# Patient Record
Sex: MALE | Race: WHITE | NOT HISPANIC OR LATINO
[De-identification: names, ages, dates, MRNs, and addresses within clinical notes are randomized per-mention and may not be internally consistent; named-entity substitution may affect disease eponyms.]

---

## 2017-09-27 ENCOUNTER — APPOINTMENT (OUTPATIENT)
Dept: UROLOGY | Facility: CLINIC | Age: 62
End: 2017-09-27
Payer: COMMERCIAL

## 2017-09-27 LAB
APPEARANCE: CLEAR
BACTERIA: NEGATIVE
BILIRUBIN URINE: NEGATIVE
BLOOD URINE: NEGATIVE
CALCIUM OXALATE CRYSTALS: NEGATIVE
COLOR: YELLOW
GLUCOSE QUALITATIVE U: NORMAL MG/DL
GRANULAR CASTS: 0 /LPF
HYALINE CASTS: 0 /LPF
KETONES URINE: NEGATIVE
LEUKOCYTE ESTERASE URINE: NEGATIVE
MICROSCOPIC-UA: NORMAL
NITRITE URINE: NEGATIVE
PH URINE: 5.5
PROTEIN URINE: NEGATIVE MG/DL
RED BLOOD CELLS URINE: 0 /HPF
SPECIFIC GRAVITY URINE: 1.02
SQUAMOUS EPITHELIAL CELLS: 1 /HPF
TRIPLE PHOSPHATE CRYSTALS: NEGATIVE
URIC ACID CRYSTALS: NEGATIVE
UROBILINOGEN URINE: NORMAL MG/DL
WHITE BLOOD CELLS URINE: 1 /HPF

## 2017-09-27 PROCEDURE — 99213 OFFICE O/P EST LOW 20 MIN: CPT

## 2017-09-28 LAB
ANION GAP SERPL CALC-SCNC: 23 MMOL/L
BUN SERPL-MCNC: 21 MG/DL
CALCIUM SERPL-MCNC: 10.4 MG/DL
CHLORIDE SERPL-SCNC: 106 MMOL/L
CO2 SERPL-SCNC: 17 MMOL/L
CREAT SERPL-MCNC: 1.33 MG/DL
GLUCOSE SERPL-MCNC: 80 MG/DL
POTASSIUM SERPL-SCNC: 4.3 MMOL/L
PSA FREE FLD-MCNC: 53.3
PSA FREE SERPL-MCNC: 0.32 NG/ML
PSA SERPL-MCNC: 0.6 NG/ML
SODIUM SERPL-SCNC: 146 MMOL/L

## 2018-09-20 ENCOUNTER — APPOINTMENT (OUTPATIENT)
Dept: UROLOGY | Facility: CLINIC | Age: 63
End: 2018-09-20
Payer: COMMERCIAL

## 2018-09-20 PROCEDURE — 99214 OFFICE O/P EST MOD 30 MIN: CPT

## 2018-09-21 LAB
APPEARANCE: CLEAR
BACTERIA: NEGATIVE
BILIRUBIN URINE: NEGATIVE
BLOOD URINE: NEGATIVE
COLOR: YELLOW
CORE LAB FLUID CYTOLOGY: NORMAL
GLUCOSE QUALITATIVE U: NEGATIVE MG/DL
HYALINE CASTS: 0 /LPF
KETONES URINE: NEGATIVE
LEUKOCYTE ESTERASE URINE: NEGATIVE
MICROSCOPIC-UA: NORMAL
NITRITE URINE: NEGATIVE
PH URINE: 6
PROTEIN URINE: NEGATIVE MG/DL
PSA FREE FLD-MCNC: 59.4
PSA FREE SERPL-MCNC: 0.41 NG/ML
PSA SERPL-MCNC: 0.69 NG/ML
RED BLOOD CELLS URINE: 1 /HPF
SPECIFIC GRAVITY URINE: 1.01
SQUAMOUS EPITHELIAL CELLS: 0 /HPF
UROBILINOGEN URINE: NEGATIVE MG/DL
WHITE BLOOD CELLS URINE: 0 /HPF

## 2018-09-26 ENCOUNTER — APPOINTMENT (OUTPATIENT)
Dept: UROLOGY | Facility: CLINIC | Age: 63
End: 2018-09-26

## 2019-01-02 ENCOUNTER — TRANSCRIPTION ENCOUNTER (OUTPATIENT)
Age: 64
End: 2019-01-02

## 2019-09-25 ENCOUNTER — APPOINTMENT (OUTPATIENT)
Dept: UROLOGY | Facility: CLINIC | Age: 64
End: 2019-09-25
Payer: COMMERCIAL

## 2019-09-25 PROCEDURE — 99214 OFFICE O/P EST MOD 30 MIN: CPT

## 2019-09-26 LAB
APPEARANCE: CLEAR
BACTERIA: NEGATIVE
BILIRUBIN URINE: NEGATIVE
BLOOD URINE: NEGATIVE
COLOR: NORMAL
GLUCOSE QUALITATIVE U: NEGATIVE
HYALINE CASTS: 0 /LPF
KETONES URINE: NEGATIVE
LEUKOCYTE ESTERASE URINE: NEGATIVE
MICROSCOPIC-UA: NORMAL
NITRITE URINE: NEGATIVE
PH URINE: 6
PROTEIN URINE: NEGATIVE
PSA FREE FLD-MCNC: 51 %
PSA FREE SERPL-MCNC: 0.3 NG/ML
PSA SERPL-MCNC: 0.58 NG/ML
RED BLOOD CELLS URINE: 1 /HPF
SPECIFIC GRAVITY URINE: 1.01
SQUAMOUS EPITHELIAL CELLS: 0 /HPF
UROBILINOGEN URINE: NORMAL
WHITE BLOOD CELLS URINE: 0 /HPF

## 2019-11-30 ENCOUNTER — TRANSCRIPTION ENCOUNTER (OUTPATIENT)
Age: 64
End: 2019-11-30

## 2020-09-23 ENCOUNTER — APPOINTMENT (OUTPATIENT)
Dept: UROLOGY | Facility: CLINIC | Age: 65
End: 2020-09-23
Payer: COMMERCIAL

## 2020-09-23 VITALS — TEMPERATURE: 97.6 F

## 2020-09-23 LAB
PSA FREE FLD-MCNC: 59 %
PSA FREE SERPL-MCNC: 0.33 NG/ML
PSA SERPL-MCNC: 0.56 NG/ML

## 2020-09-23 PROCEDURE — 99214 OFFICE O/P EST MOD 30 MIN: CPT

## 2020-09-24 LAB
APPEARANCE: CLEAR
BACTERIA: NEGATIVE
BILIRUBIN URINE: NEGATIVE
BLOOD URINE: NEGATIVE
COLOR: NORMAL
GLUCOSE QUALITATIVE U: NEGATIVE
HYALINE CASTS: 0 /LPF
KETONES URINE: NEGATIVE
LEUKOCYTE ESTERASE URINE: NEGATIVE
MICROSCOPIC-UA: NORMAL
NITRITE URINE: NEGATIVE
PH URINE: 5.5
PROTEIN URINE: NEGATIVE
RED BLOOD CELLS URINE: 1 /HPF
SPECIFIC GRAVITY URINE: 1.03
SQUAMOUS EPITHELIAL CELLS: 0 /HPF
UROBILINOGEN URINE: NORMAL
WHITE BLOOD CELLS URINE: 1 /HPF

## 2021-07-28 ENCOUNTER — NON-APPOINTMENT (OUTPATIENT)
Age: 66
End: 2021-07-28

## 2021-09-22 ENCOUNTER — APPOINTMENT (OUTPATIENT)
Dept: UROLOGY | Facility: CLINIC | Age: 66
End: 2021-09-22
Payer: COMMERCIAL

## 2021-09-22 LAB
APPEARANCE: CLEAR
BACTERIA: NEGATIVE
BILIRUBIN URINE: NEGATIVE
BLOOD URINE: NEGATIVE
COLOR: NORMAL
GLUCOSE QUALITATIVE U: NEGATIVE
HYALINE CASTS: 2 /LPF
KETONES URINE: NEGATIVE
LEUKOCYTE ESTERASE URINE: NEGATIVE
MICROSCOPIC-UA: NORMAL
NITRITE URINE: NEGATIVE
PH URINE: 6
PROTEIN URINE: NEGATIVE
PSA FREE FLD-MCNC: 51 %
PSA FREE SERPL-MCNC: 0.32 NG/ML
PSA SERPL-MCNC: 0.63 NG/ML
RED BLOOD CELLS URINE: 0 /HPF
SPECIFIC GRAVITY URINE: 1.02
SQUAMOUS EPITHELIAL CELLS: 0 /HPF
UROBILINOGEN URINE: NORMAL
WHITE BLOOD CELLS URINE: 1 /HPF

## 2021-09-22 PROCEDURE — 99214 OFFICE O/P EST MOD 30 MIN: CPT

## 2022-09-21 ENCOUNTER — APPOINTMENT (OUTPATIENT)
Dept: UROLOGY | Facility: CLINIC | Age: 67
End: 2022-09-21

## 2022-09-21 VITALS
WEIGHT: 205 LBS | HEART RATE: 80 BPM | RESPIRATION RATE: 16 BRPM | TEMPERATURE: 98 F | OXYGEN SATURATION: 98 % | SYSTOLIC BLOOD PRESSURE: 126 MMHG | BODY MASS INDEX: 30.36 KG/M2 | HEIGHT: 69 IN | DIASTOLIC BLOOD PRESSURE: 86 MMHG

## 2022-09-21 DIAGNOSIS — R35.0 FREQUENCY OF MICTURITION: ICD-10-CM

## 2022-09-21 PROCEDURE — 99214 OFFICE O/P EST MOD 30 MIN: CPT

## 2022-09-22 LAB
APPEARANCE: CLEAR
BACTERIA: NEGATIVE
BILIRUBIN URINE: NEGATIVE
BLOOD URINE: NEGATIVE
COLOR: YELLOW
GLUCOSE QUALITATIVE U: NEGATIVE
HYALINE CASTS: 1 /LPF
KETONES URINE: NEGATIVE
LEUKOCYTE ESTERASE URINE: NEGATIVE
MICROSCOPIC-UA: NORMAL
NITRITE URINE: NEGATIVE
PH URINE: 6
PROTEIN URINE: NEGATIVE
PSA FREE FLD-MCNC: 49 %
PSA FREE SERPL-MCNC: 0.31 NG/ML
PSA SERPL-MCNC: 0.65 NG/ML
RED BLOOD CELLS URINE: 1 /HPF
SPECIFIC GRAVITY URINE: 1.02
SQUAMOUS EPITHELIAL CELLS: 0 /HPF
UROBILINOGEN URINE: NORMAL
WHITE BLOOD CELLS URINE: 0 /HPF

## 2022-09-23 LAB — URINE CYTOLOGY: NORMAL

## 2023-08-18 ENCOUNTER — OFFICE (OUTPATIENT)
Facility: LOCATION | Age: 68
Setting detail: OPHTHALMOLOGY
End: 2023-08-18
Payer: MEDICARE

## 2023-08-18 DIAGNOSIS — H40.013: ICD-10-CM

## 2023-08-18 DIAGNOSIS — H52.03: ICD-10-CM

## 2023-08-18 DIAGNOSIS — H52.7: ICD-10-CM

## 2023-08-18 PROCEDURE — 99212 OFFICE O/P EST SF 10 MIN: CPT | Performed by: OPHTHALMOLOGY

## 2023-08-18 PROCEDURE — 92015 DETERMINE REFRACTIVE STATE: CPT | Performed by: OPHTHALMOLOGY

## 2023-08-18 PROCEDURE — 92020 GONIOSCOPY: CPT | Performed by: OPHTHALMOLOGY

## 2023-08-18 PROCEDURE — 92133 CPTRZD OPH DX IMG PST SGM ON: CPT | Performed by: OPHTHALMOLOGY

## 2023-08-18 ASSESSMENT — REFRACTION_MANIFEST
OU_VA: 20/20
OS_CYLINDER: SPH
OD_SPHERE: +1.50
OS_VA1: 20/20
OS_CYLINDER: SPH
OS_SPHERE: +1.25
OS_ADD: +2.50
OD_VA1: 20/15
OD_ADD: +2.50
OD_AXIS: 090
OD_VA1: 20/20
OS_SPHERE: +1.25
OD_CYLINDER: -0.25
OD_CYLINDER: SPH
OD_SPHERE: +1.50
OS_VA1: 20/20

## 2023-08-18 ASSESSMENT — KERATOMETRY
OD_AXISANGLE_DEGREES: 115
OS_AXISANGLE_DEGREES: 058
OS_K1POWER_DIOPTERS: 41.00
OS_K2POWER_DIOPTERS: 40.75
OD_K2POWER_DIOPTERS: 40.50
OD_K1POWER_DIOPTERS: 41.00

## 2023-08-18 ASSESSMENT — VISUAL ACUITY
OD_BCVA: 20/25+2
OS_BCVA: 20/25

## 2023-08-18 ASSESSMENT — LID EXAM ASSESSMENTS: OD_EDEMA: RUL 2+

## 2023-08-18 ASSESSMENT — SPHEQUIV_DERIVED
OD_SPHEQUIV: 1.75
OD_SPHEQUIV: 1.375

## 2023-08-18 ASSESSMENT — REFRACTION_CURRENTRX
OD_VPRISM_DIRECTION: PROGS
OS_OVR_VA: 20/
OD_ADD: +2.25
OS_SPHERE: +0.75
OD_SPHERE: +0.75
OS_ADD: +2.25
OD_OVR_VA: 20/
OS_CYLINDER: SPH
OD_CYLINDER: SPH
OS_VPRISM_DIRECTION: PROGS

## 2023-08-18 ASSESSMENT — REFRACTION_AUTOREFRACTION
OD_CYLINDER: -1.00
OD_SPHERE: +2.25
OS_SPHERE: +2.25
OD_AXIS: 107
OS_CYLINDER: SPH

## 2023-08-18 ASSESSMENT — AXIALLENGTH_DERIVED
OD_AL: 23.9248
OD_AL: 24.076

## 2023-08-18 ASSESSMENT — CONFRONTATIONAL VISUAL FIELD TEST (CVF)
OS_FINDINGS: FULL
OD_FINDINGS: FULL

## 2023-09-21 ENCOUNTER — APPOINTMENT (OUTPATIENT)
Dept: UROLOGY | Facility: CLINIC | Age: 68
End: 2023-09-21
Payer: MEDICARE

## 2023-09-21 VITALS
HEART RATE: 58 BPM | SYSTOLIC BLOOD PRESSURE: 151 MMHG | RESPIRATION RATE: 16 BRPM | WEIGHT: 190 LBS | HEIGHT: 69 IN | OXYGEN SATURATION: 98 % | BODY MASS INDEX: 28.14 KG/M2 | DIASTOLIC BLOOD PRESSURE: 91 MMHG

## 2023-09-21 DIAGNOSIS — N40.1 BENIGN PROSTATIC HYPERPLASIA WITH LOWER URINARY TRACT SYMPMS: ICD-10-CM

## 2023-09-21 DIAGNOSIS — C64.9 MALIGNANT NEOPLASM OF UNSPECIFIED KIDNEY, EXCEPT RENAL PELVIS: ICD-10-CM

## 2023-09-21 DIAGNOSIS — N13.8 BENIGN PROSTATIC HYPERPLASIA WITH LOWER URINARY TRACT SYMPMS: ICD-10-CM

## 2023-09-21 PROCEDURE — 99214 OFFICE O/P EST MOD 30 MIN: CPT

## 2023-09-22 LAB
APPEARANCE: CLEAR
BACTERIA: NEGATIVE /HPF
BILIRUBIN URINE: NEGATIVE
BLOOD URINE: NEGATIVE
CAST: 0 /LPF
COLOR: YELLOW
EPITHELIAL CELLS: 0 /HPF
GLUCOSE QUALITATIVE U: NEGATIVE MG/DL
KETONES URINE: NEGATIVE MG/DL
LEUKOCYTE ESTERASE URINE: NEGATIVE
MICROSCOPIC-UA: NORMAL
NITRITE URINE: NEGATIVE
PH URINE: 5.5
PROTEIN URINE: NEGATIVE MG/DL
PSA FREE FLD-MCNC: 51 %
PSA FREE SERPL-MCNC: 0.31 NG/ML
PSA SERPL-MCNC: 0.61 NG/ML
RED BLOOD CELLS URINE: 0 /HPF
SPECIFIC GRAVITY URINE: 1.01
UROBILINOGEN URINE: 0.2 MG/DL
WHITE BLOOD CELLS URINE: 0 /HPF

## 2024-02-28 ENCOUNTER — EMERGENCY (EMERGENCY)
Facility: HOSPITAL | Age: 69
LOS: 1 days | Discharge: HOME | End: 2024-02-28
Attending: EMERGENCY MEDICINE
Payer: MEDICARE

## 2024-02-28 VITALS
SYSTOLIC BLOOD PRESSURE: 144 MMHG | HEIGHT: 70 IN | RESPIRATION RATE: 18 BRPM | DIASTOLIC BLOOD PRESSURE: 91 MMHG | OXYGEN SATURATION: 97 % | HEART RATE: 97 BPM | TEMPERATURE: 98 F | WEIGHT: 190.04 LBS

## 2024-02-28 LAB
ALBUMIN SERPL ELPH-MCNC: 4.3 G/DL — SIGNIFICANT CHANGE UP (ref 3.3–5)
ALP SERPL-CCNC: 50 U/L — SIGNIFICANT CHANGE UP (ref 40–120)
ALT FLD-CCNC: <37 U/L — SIGNIFICANT CHANGE UP (ref 10–45)
ANION GAP SERPL CALC-SCNC: 11 MMOL/L — SIGNIFICANT CHANGE UP (ref 5–17)
ANION GAP SERPL CALC-SCNC: 12 MMOL/L — SIGNIFICANT CHANGE UP (ref 5–17)
AST SERPL-CCNC: 99 U/L — HIGH (ref 10–40)
BASOPHILS # BLD AUTO: 0.03 K/UL — SIGNIFICANT CHANGE UP (ref 0–0.2)
BASOPHILS NFR BLD AUTO: 0.4 % — SIGNIFICANT CHANGE UP (ref 0–2)
BILIRUB SERPL-MCNC: 1 MG/DL — SIGNIFICANT CHANGE UP (ref 0.2–1.2)
BUN SERPL-MCNC: 19 MG/DL — SIGNIFICANT CHANGE UP (ref 7–23)
BUN SERPL-MCNC: 21 MG/DL — SIGNIFICANT CHANGE UP (ref 7–23)
CALCIUM SERPL-MCNC: 9.4 MG/DL — SIGNIFICANT CHANGE UP (ref 8.4–10.5)
CALCIUM SERPL-MCNC: 9.7 MG/DL — SIGNIFICANT CHANGE UP (ref 8.4–10.5)
CHLORIDE SERPL-SCNC: 100 MMOL/L — SIGNIFICANT CHANGE UP (ref 96–108)
CHLORIDE SERPL-SCNC: 101 MMOL/L — SIGNIFICANT CHANGE UP (ref 96–108)
CO2 SERPL-SCNC: 21 MMOL/L — LOW (ref 22–31)
CO2 SERPL-SCNC: 25 MMOL/L — SIGNIFICANT CHANGE UP (ref 22–31)
CREAT SERPL-MCNC: 1.08 MG/DL — SIGNIFICANT CHANGE UP (ref 0.5–1.3)
CREAT SERPL-MCNC: 1.16 MG/DL — SIGNIFICANT CHANGE UP (ref 0.5–1.3)
EGFR: 69 ML/MIN/1.73M2 — SIGNIFICANT CHANGE UP
EGFR: 76 ML/MIN/1.73M2 — SIGNIFICANT CHANGE UP
EOSINOPHIL # BLD AUTO: 0.05 K/UL — SIGNIFICANT CHANGE UP (ref 0–0.5)
EOSINOPHIL NFR BLD AUTO: 0.7 % — SIGNIFICANT CHANGE UP (ref 0–6)
FLUAV AG NPH QL: SIGNIFICANT CHANGE UP
FLUBV AG NPH QL: SIGNIFICANT CHANGE UP
GLUCOSE SERPL-MCNC: 106 MG/DL — HIGH (ref 70–99)
GLUCOSE SERPL-MCNC: 119 MG/DL — HIGH (ref 70–99)
HCT VFR BLD CALC: 42.8 % — SIGNIFICANT CHANGE UP (ref 39–50)
HGB BLD-MCNC: 14.2 G/DL — SIGNIFICANT CHANGE UP (ref 13–17)
IMM GRANULOCYTES NFR BLD AUTO: 0.3 % — SIGNIFICANT CHANGE UP (ref 0–0.9)
INR BLD: 1.09 RATIO — SIGNIFICANT CHANGE UP (ref 0.85–1.18)
LYMPHOCYTES # BLD AUTO: 0.97 K/UL — LOW (ref 1–3.3)
LYMPHOCYTES # BLD AUTO: 13.4 % — SIGNIFICANT CHANGE UP (ref 13–44)
MCHC RBC-ENTMCNC: 29.5 PG — SIGNIFICANT CHANGE UP (ref 27–34)
MCHC RBC-ENTMCNC: 33.2 GM/DL — SIGNIFICANT CHANGE UP (ref 32–36)
MCV RBC AUTO: 89 FL — SIGNIFICANT CHANGE UP (ref 80–100)
MONOCYTES # BLD AUTO: 0.7 K/UL — SIGNIFICANT CHANGE UP (ref 0–0.9)
MONOCYTES NFR BLD AUTO: 9.7 % — SIGNIFICANT CHANGE UP (ref 2–14)
NEUTROPHILS # BLD AUTO: 5.47 K/UL — SIGNIFICANT CHANGE UP (ref 1.8–7.4)
NEUTROPHILS NFR BLD AUTO: 75.5 % — SIGNIFICANT CHANGE UP (ref 43–77)
NRBC # BLD: 0 /100 WBCS — SIGNIFICANT CHANGE UP (ref 0–0)
PLATELET # BLD AUTO: 208 K/UL — SIGNIFICANT CHANGE UP (ref 150–400)
POTASSIUM SERPL-MCNC: 4.3 MMOL/L — SIGNIFICANT CHANGE UP (ref 3.5–5.3)
POTASSIUM SERPL-MCNC: SIGNIFICANT CHANGE UP MMOL/L (ref 3.5–5.3)
POTASSIUM SERPL-SCNC: 4.3 MMOL/L — SIGNIFICANT CHANGE UP (ref 3.5–5.3)
POTASSIUM SERPL-SCNC: SIGNIFICANT CHANGE UP MMOL/L (ref 3.5–5.3)
PROT SERPL-MCNC: 8.1 G/DL — SIGNIFICANT CHANGE UP (ref 6–8.3)
PROTHROM AB SERPL-ACNC: 11.4 SEC — SIGNIFICANT CHANGE UP (ref 9.5–13)
RBC # BLD: 4.81 M/UL — SIGNIFICANT CHANGE UP (ref 4.2–5.8)
RBC # FLD: 12.6 % — SIGNIFICANT CHANGE UP (ref 10.3–14.5)
RSV RNA NPH QL NAA+NON-PROBE: SIGNIFICANT CHANGE UP
SARS-COV-2 RNA SPEC QL NAA+PROBE: SIGNIFICANT CHANGE UP
SODIUM SERPL-SCNC: 132 MMOL/L — LOW (ref 135–145)
SODIUM SERPL-SCNC: 138 MMOL/L — SIGNIFICANT CHANGE UP (ref 135–145)
TROPONIN T, HIGH SENSITIVITY RESULT: 9 NG/L — SIGNIFICANT CHANGE UP (ref 0–51)
WBC # BLD: 7.24 K/UL — SIGNIFICANT CHANGE UP (ref 3.8–10.5)
WBC # FLD AUTO: 7.24 K/UL — SIGNIFICANT CHANGE UP (ref 3.8–10.5)

## 2024-02-28 PROCEDURE — 99223 1ST HOSP IP/OBS HIGH 75: CPT | Mod: FS

## 2024-02-28 PROCEDURE — 70496 CT ANGIOGRAPHY HEAD: CPT | Mod: 26,MC

## 2024-02-28 PROCEDURE — 70498 CT ANGIOGRAPHY NECK: CPT | Mod: 26,MC

## 2024-02-28 PROCEDURE — 70551 MRI BRAIN STEM W/O DYE: CPT | Mod: 26,MC

## 2024-02-28 PROCEDURE — 70450 CT HEAD/BRAIN W/O DYE: CPT | Mod: 26,MC,XU

## 2024-02-28 RX ORDER — SODIUM CHLORIDE 9 MG/ML
1000 INJECTION INTRAMUSCULAR; INTRAVENOUS; SUBCUTANEOUS ONCE
Refills: 0 | Status: COMPLETED | OUTPATIENT
Start: 2024-02-28 | End: 2024-02-28

## 2024-02-28 RX ORDER — DIPHENHYDRAMINE HCL 50 MG
25 CAPSULE ORAL ONCE
Refills: 0 | Status: ACTIVE | OUTPATIENT
Start: 2024-02-28 | End: 2025-01-26

## 2024-02-28 RX ORDER — FENOFIBRATE,MICRONIZED 130 MG
145 CAPSULE ORAL DAILY
Refills: 0 | Status: ACTIVE | OUTPATIENT
Start: 2024-02-28 | End: 2025-01-26

## 2024-02-28 RX ORDER — ATORVASTATIN CALCIUM 80 MG/1
40 TABLET, FILM COATED ORAL AT BEDTIME
Refills: 0 | Status: ACTIVE | OUTPATIENT
Start: 2024-02-28 | End: 2025-01-26

## 2024-02-28 RX ORDER — AMLODIPINE BESYLATE 2.5 MG/1
10 TABLET ORAL DAILY
Refills: 0 | Status: ACTIVE | OUTPATIENT
Start: 2024-02-28 | End: 2025-01-26

## 2024-02-28 RX ORDER — MECLIZINE HCL 12.5 MG
25 TABLET ORAL ONCE
Refills: 0 | Status: COMPLETED | OUTPATIENT
Start: 2024-02-28 | End: 2024-02-28

## 2024-02-28 RX ORDER — MECLIZINE HCL 12.5 MG
12.5 TABLET ORAL
Refills: 0 | Status: ACTIVE | OUTPATIENT
Start: 2024-02-28 | End: 2025-01-26

## 2024-02-28 RX ORDER — METOCLOPRAMIDE HCL 10 MG
10 TABLET ORAL ONCE
Refills: 0 | Status: ACTIVE | OUTPATIENT
Start: 2024-02-28 | End: 2025-01-26

## 2024-02-28 RX ADMIN — ATORVASTATIN CALCIUM 40 MILLIGRAM(S): 80 TABLET, FILM COATED ORAL at 23:42

## 2024-02-28 RX ADMIN — Medication 25 MILLIGRAM(S): at 16:55

## 2024-02-28 RX ADMIN — SODIUM CHLORIDE 1000 MILLILITER(S): 9 INJECTION INTRAMUSCULAR; INTRAVENOUS; SUBCUTANEOUS at 16:53

## 2024-02-28 NOTE — ED CDU PROVIDER INITIAL DAY NOTE - CLINICAL SUMMARY MEDICAL DECISION MAKING FREE TEXT BOX
Attending Kathy Holman: 69 yo male presenting with dizziness. placed in cdu for mri and neuro checks

## 2024-02-28 NOTE — ED PROVIDER NOTE - CLINICAL SUMMARY MEDICAL DECISION MAKING FREE TEXT BOX
Attending Kathy Holman: 69 yo male presenting with dizziness. code stroke called upon arrival and pt taken to ct scan. pt reports feeling dizzy this am worse with moving his head. no falls or recent trauma. did report recent uri symptoms. seen by neurology andtaken to ct scan. nonfocal neurologic exam. trenght and senation in tact, no protonator drift. consider possible vetibular neuritis vs posterior infarct vs dissection. cta performed and no evidence of hemorrhage. d/w neuro who are recommending cdu for neuro checks and mri.

## 2024-02-28 NOTE — ED CDU PROVIDER INITIAL DAY NOTE - ATTENDING APP SHARED VISIT CONTRIBUTION OF CARE
Attending MD Kathy Holman:  I personally made/approved the management plan and take responsibility for the patient management.  Physician assistant note reviewed and agree on plan of care and except where noted.  See HPI, PE, and MDM for details.

## 2024-02-28 NOTE — ED ADULT NURSE NOTE - NSFALLRISKINTERV_ED_ALL_ED

## 2024-02-28 NOTE — CONSULT NOTE ADULT - ASSESSMENT
ASSESSMENT   68-year-old male with history of renal cell carcinoma status post nephrectomy, hyperlipidemia presenting with concern for dizziness.   Patient states last night he started not feeling well and then approximately 3 AM started having low-grade fevers, though at this time did not have any sensations of dizziness. On day of presentation, when getting out of bed felt the onset of 'room spinning' + nausea. Symptoms worsened with sitting up/standing up, also worsened with turning head to the L. Has had 1 prior episode of vertigo in the past (similar but less severe in nature), was attributed to medication (BP medication) adjustments. Reports no recent illnesses infections.  /91  glucose 108   2/28/24  NIHSS 0  MRS 0    IMPRESSION   Acute vestibular syndrome which may be more peripheral, lower suspicion for central etiology given fluctuating symptoms, prior similar episode, FH vertigo.     RECOMMENDATION       Patient to be seen by team and attending. Note finalized upon attending attestation.  ASSESSMENT   68-year-old male with history of renal cell carcinoma status post nephrectomy, hyperlipidemia presenting with concern for dizziness.   Patient states last night he started not feeling well and then approximately 3 AM started having low-grade fevers, though at this time did not have any sensations of dizziness. On day of presentation, when getting out of bed felt the onset of 'room spinning' + nausea. Symptoms worsened with sitting up/standing up, also worsened with turning head to the L. Has had 1 prior episode of vertigo in the past (similar but less severe in nature), was attributed to medication (BP medication) adjustments. Reports no recent illnesses infections. CThead + CTA unremarkable.  /91  glucose 108   2/28/24  NIHSS 0  MRS 0    IMPRESSION   Acute vestibular syndrome which may be more peripheral, lower suspicion for central etiology given fluctuating symptoms, prior similar episode, FH vertigo.     RECOMMENDATION       Patient to be seen by team and attending. Note finalized upon attending attestation.  ASSESSMENT   68-year-old male with history of renal cell carcinoma status post nephrectomy, hyperlipidemia presenting with concern for dizziness.   Patient states last night he started not feeling well and then approximately 3 AM started having low-grade fevers, though at this time did not have any sensations of dizziness. On day of presentation, when getting out of bed felt the onset of 'room spinning' + nausea. Symptoms worsened with sitting up/standing up, also worsened with turning head to the L. Has had 1 prior episode of vertigo in the past (similar but less severe in nature), was attributed to medication (BP medication) adjustments. Reports no recent illnesses infections. CThead + CTA unremarkable.  /91  glucose 108   2/28/24  NIHSS 0  MRS 0    IMPRESSION   Acute vestibular syndrome which may be more peripheral, lower suspicion for central etiology given fluctuating symptoms, prior similar episode, FH vertigo.     RECOMMENDATION incomplete; not yet discussed with stroke fellow  [] orthostatic vitals  [] Meclizine 12.5mg BID PRN  [] Reglan 4mg  PRN Q8H  []   []   [] Refer for Vestibular Rehab on discharge  [] ENT eval on discharge  [] Neurology f/u outpatient      Patient to be seen by team and attending. Note finalized upon attending attestation.  ASSESSMENT   68-year-old male with history of renal cell carcinoma status post nephrectomy, hyperlipidemia presenting with concern for dizziness.   Patient states last night he started not feeling well and then approximately 3 AM started having low-grade fevers, though at this time did not have any sensations of dizziness. On day of presentation, when getting out of bed felt the onset of 'room spinning' + nausea. Symptoms worsened with sitting up/standing up, also worsened with turning head to the L. Has had 1 prior episode of vertigo in the past (similar but less severe in nature), was attributed to medication (BP medication) adjustments. Reports no recent illnesses infections. CThead + CTA unremarkable.  /91  glucose 108   2/28/24  NIHSS 0  MRS 0    IMPRESSION   Acute vestibular syndrome which has higher suspicion of peripheral etiology, lower suspicion for central etiology given fluctuating symptoms with complete resolution back to baseline when not , prior similar episode, FH vertigo.     RECOMMENDATION   [] orthostatic vitals  [] Meclizine 12.5mg BID PRN  [] Reglan 4mg  PRN Q8H  [] CDU for MRI brain w/o contrast   [] if unable to obtain CDU bed, would consider discussing with medicine team admission for peripheral vertigo + difficulty walking, per family preference, appreciate assistance  [] Refer for Vestibular Rehab on discharge  [] ENT eval on discharge  [] Neurology f/u outpatient  -Upon discharge, patient should follow up with Dr. Easley:  [phone] [address] [place] [address]    Discussed with stroke fellow under supervision of stroke attending.  Patient to be seen by team and attending. Note finalized upon attending attestation.  ASSESSMENT   68-year-old male with history of renal cell carcinoma status post nephrectomy, hyperlipidemia presenting with concern for dizziness.   Patient states last night he started not feeling well and then approximately 3 AM started having low-grade fevers, though at this time did not have any sensations of dizziness. On day of presentation, when getting out of bed felt the onset of 'room spinning' + nausea. Symptoms worsened with sitting up/standing up, also worsened with turning head to the L. Has had 1 prior episode of vertigo in the past (similar but less severe in nature), was attributed to medication (BP medication) adjustments. Reports no recent illnesses infections. CThead + CTA unremarkable.  /91  glucose 108   2/28/24  NIHSS 0  MRS 0    IMPRESSION   Acute vestibular syndrome which has higher suspicion of peripheral etiology, lower suspicion for central etiology given fluctuating symptoms with complete resolution back to baseline when not , prior similar episode, FH vertigo.     RECOMMENDATION   [] orthostatic vitals  [] Meclizine 12.5mg BID PRN  [] Reglan 4mg  PRN Q8H  [] CDU for MRI brain w/o contrast per family preference, from neurovascular standpoint can be done inpatient or outpatient given higher suspicion for peripheral etiology  [] if unable to obtain CDU bed, would consider discussing with medicine team admission for peripheral vertigo + difficulty walking, per family preference, appreciate assistance  [] Refer for Vestibular Rehab on discharge  [] ENT eval on discharge  [] Neurology f/u outpatient  -Upon discharge, patient should follow up with Dr. Easley:  [phone] [address] [place] [address]    Discussed with stroke fellow under supervision of stroke attending.  Patient to be seen by team and attending. Note finalized upon attending attestation.

## 2024-02-28 NOTE — CONSULT NOTE ADULT - ATTENDING COMMENTS
DOS 2/29  code stroke call on arrival and neuro emergently assessed patinet   Briefly      68-year-old male with   renal cell carcinoma status post nephrectomy, hyperlipidemia presenting with concern for dizziness.     /91  glucose 108   2/28/24  NIHSS 0  MRS 0  CTH neg   CTA H/N neg   MRI brain neg   A1c6  LDL 49       IMPRESSION   Acute vestibular syndrome . stroke ruled out     RECOMMENDATION   [] orthostatic vitals  [] Meclizine 25mg BID PRN  [] Reglan 4mg  PRN Q8H  [] if above ineffective try benzos   [] Refer for Vestibular Rehab on discharge  [] ENT eval on discharge  [] Neurology f/u outpatient  -Upon discharge, patient should follow up with Dr. Easley:  [phone] [address] [place], [place] [address] planning if cleared by PT   Dylan Easley MD  Vascular Neurology  Office: [phone]

## 2024-02-28 NOTE — ED CDU PROVIDER INITIAL DAY NOTE - PROGRESS NOTE DETAILS
MRI resulted, showing "No acute infarction, bleeding, or shift."  Patient made aware. Per neurology resident, no additional recommendations at this time. Patient still dizzy with standing/ambulating. Patient is A+Ox3. EOMI, strength 5/5 UE/LE, clear speech, following commands, conversational. PT consult placed. Meclizine ordered as needed. Will continue to monitor and reassess overnight/in the morning. - Yaima Prater PA-C

## 2024-02-28 NOTE — ED ADULT NURSE NOTE - OBJECTIVE STATEMENT
67 y/o M with PMHx renal cell carcinoma status post right nephrectomy  with c/o sudden onset of dizziness. Pt states he went to bed fine around 7am  and woke up from his sleep around 3am with fever and chills and room spinning dizziness. code stroke was activate don arrival and later cancelled after further evaluation by ed provider. IV placed and labs drawn and sent per order, pt taken to CT for testing and evaluated by neuro team. pt is  A&Ox4 able to follow all commands. Pulse, motor, sensation present and equal in all 4 extremities. Denies HA,  blurry vision. c/o tinnitus in his ears which is chronic. Respirations spontaneous and unlabored on room air. Denies SOB, dyspnea, cough, CP, palpitations. EKG done. On CM, NSR. Denies abd pain, N/V/D/C. Abd soft NT/ND.  Denies urinary symptoms. Denies fever, chills. No sick contacts. Skin intact, warm, dry, normal for race.

## 2024-02-28 NOTE — ED CDU PROVIDER INITIAL DAY NOTE - OBJECTIVE STATEMENT
68-year-old male with history of renal cell carcinoma status post nephrectomy presenting with concern for dizziness.  pt states he awoke at 3am today went to get up and became very dizzy and unsteady and had to sit back down. pt states that he had something similar occur years ago but was medication related. no new medications, only had increase of his amlodipine and that was 1 month ago. +N, no vomiting.  Symptoms improve when not moving. symptoms improved while in ED but still dizzy with movement.  No coughs or colds. No black or bloody stools.  Patient denies any chest pain.  Does have a history of high blood pressure and has increased his meds recently. no URI symptoms. +chills yesterday, no recorded fever.  meds amlodipine 10mg, atorvastatin 40mg and fenofibrate 145mg

## 2024-02-28 NOTE — ED CDU PROVIDER INITIAL DAY NOTE - CONSTITUTIONAL, MLM
Otc Regimen: Turmeric, Biotin 5mg daily. The patient was advised to get 5% Men’s Rogain daily Detail Level: Simple Continue Regimen: Doxycycline 50mg daily, Fluocinolone oil normal... Well appearing, awake, alert, oriented to person, place, time/situation and in no apparent distress.

## 2024-02-28 NOTE — ED PROVIDER NOTE - PHYSICAL EXAMINATION
GEN: Pt in NAD, A&O x3.  PSYCH: Affect appropriate.  EYES: Sclera white w/o injection. PERRL, EOMI. trace fatigable horizontal rightward nystagmus.  ENT: Head NCAT. MMM. EACs clear, TMs pearly grey, no vesicles. Neck supple FROM.  RESP: CTA b/l, no wheezes, rales, or rhonchi.   CARDIAC: RRR, clear distinct S1, S2, no appreciable murmurs.  ABD: Abdomen soft, non-tender. No CVAT b/l.  VASC: 2+ radial and dorsalis pedis pulses b/l. No edema or calf tenderness.  NEURO: NIHSS 0. CN2-12 grossly intact. Normal and equal sensation and 5/5 strength UE and LE b/l. Pronator drift negative. Normal gross cerebellar function.   SKIN: No rashes on the trunk. GEN: Pt in NAD, A&O x3.  PSYCH: Affect appropriate.  EYES: Sclera white w/o injection. PERRL, EOMI. trace fatigable horizontal rightward nystagmus.  ENT: Head NCAT. MMM. EACs clear, TMs pearly grey, no vesicles. Neck supple FROM.  RESP: CTA b/l, no wheezes, rales, or rhonchi.   CARDIAC: RRR, clear distinct S1, S2, no appreciable murmurs.  ABD: Abdomen soft, non-tender. No CVAT b/l.  VASC: 2+ radial and dorsalis pedis pulses b/l. No edema or calf tenderness.  NEURO: NIHSS 0. CN2-12 grossly intact. Normal and equal sensation and 5/5 strength UE and LE b/l. Pronator drift negative. Normal gross cerebellar function.   SKIN: No rashes on the trunk.  Attending Kathy Holman: Gen: NAD, heent: atrauamtic, eomi, perrla, mmm, op pink, uvula midline,no nystagmu neck; nttp, no nuchal rigidity, chest: nttp, no crepitus, cv: rrr, no murmurs, lungs: ctab, abd: soft, nontender, nondistended, no peritoneal signs, , no guarding, ext: wwp, neg homans, skin: no rash, neuro: awake and alert, following commands, speech clear, sensation and strength intact, no focal deficits no protonator drift

## 2024-02-28 NOTE — ED PROVIDER NOTE - OBJECTIVE STATEMENT
Attending Kathy Holman: 68-year-old male with history of renal cell carcinoma status post nephrectomy presenting with concern for dizziness.  Patient states last night he started not feeling well and then approximately 3 AM started having low-grade fevers.  This morning when he went to move his head felt very dizzy like the room was spinning with associated nausea.  Symptoms improved when not moving.  History of vertigo in the past 1 has URI symptoms.  No coughs or colds.  Currently states he is feeling better but worsens with movement.  No black or bloody stools.  Patient denies any chest pain.  Does have a history of high blood pressure and has increased his meds recently. Attending Kathy Holman: 68-year-old male with history of renal cell carcinoma status post nephrectomy presenting with concern for dizziness.  Patient states last night he started not feeling well and then approximately 3 AM started having low-grade fevers.  This morning when he went to move his head felt very dizzy like the room was spinning with associated nausea.  Symptoms improved when not moving.  History of vertigo in the past 1 has URI symptoms.  No coughs or colds.  Currently states he is feeling better but worsens with movement.  No black or bloody stools.  Patient denies any chest pain.  Does have a history of high blood pressure and has increased his meds recently.    -Deyvi Andino PA-C 68-year-old male with PMH of renal cell carcinoma status post right nephrectomy presents to the ED complaining of acute onset dizziness which began approximately 3 AM this morning when he awoke from sleep with episode of fever and chills.  Patient reports he has constant tinnitus in his ears which is chronic, developed room spinning dizziness since 3 AM, today had difficulty walking due to vertigo and came to the emergency department for evaluation.  Patient denies prior history of vertigo.  Patient reports no change in hearing, no other recent URI symptoms.  Patient reports symptoms have improved somewhat since onset.  Patient denies numbness, paresthesia, weakness, visual changes, vomiting, recent head injury, anticoagulation or antiplatelet use, diarrhea, melena, hematochezia

## 2024-02-28 NOTE — ED PROVIDER NOTE - PROGRESS NOTE DETAILS
Discussed with neurology and with patient and family.  There was concern about possible artifact regarding the cerebellar region on CT, patient with some improvement with meclizine but still persistently symptomatic, recommendation is for MRI in the CDU and symptomatic treatment.  Patient and family agreeable with this option and would like to go to the CDU, I discussed with the CDU KALEY Greenwood who accepts patient to CDU. -Deyvi Andino PA-C

## 2024-02-28 NOTE — ED PROVIDER NOTE - ATTENDING APP SHARED VISIT CONTRIBUTION OF CARE
[place] website --- [email or web address] Website --- [email or web address] Attending MD Kathy Holman:  I personally made/approved the management plan and take responsibility for the patient management.  Physician assistant note reviewed and agree on plan of care and except where noted.  See HPI, PE, and MDM for details.

## 2024-02-28 NOTE — CONSULT NOTE ADULT - SUBJECTIVE AND OBJECTIVE BOX
Neurology - Consult Note    Spectra: [phone] ([facility]), [phone] ([facility])    HPI:  "68-year-old male with history of renal cell carcinoma status post nephrectomy presenting with concern for dizziness.  Patient states last night he started not feeling well and then approximately 3 AM started having low-grade fevers.  This morning when he went to move his head felt very dizzy like the room was spinning with associated nausea.  Symptoms improved when not moving.  History of vertigo in the past 1 has URI symptoms.  No coughs or colds.  Currently states he is feeling better but worsens with movement.  No black or bloody stools.  Patient denies any chest pain.  Does have a history of high blood pressure and has increased his meds recently."        Review of Systems:  INCOMPLETE   CONSTITUTIONAL: No fevers or chills  EYES AND ENT: No visual changes or no throat pain   NECK: No pain or stiffness  RESPIRATORY: No shortness of breath  CARDIOVASCULAR: No chest pain or palpitations  GASTROINTESTINAL: No nausea or vomiting   GENITOURINARY: No dysuria  NEUROLOGICAL: +As stated in HPI above  SKIN: No itching, burning, rashes, or lesions   All other review of systems is negative unless indicated above.    Allergies:  No Known Allergies      PMHx/PSHx/Family Hx: As above, otherwise see below       Social Hx:  Never smoker; no current use of tobacco, alcohol, or illicit drugs  Lives with ***  Occupation ***  Baseline functional status is ***    Medications:  MEDICATIONS  (STANDING):    MEDICATIONS  (PRN):      Vitals:  T(C): 36.8 (02-28-24 @ 13:41), Max: 36.8 (02-28-24 @ 13:41)  HR: 97 (02-28-24 @ 13:41) (97 - 97)  BP: 144/91 (02-28-24 @ 13:41) (144/91 - 144/91)  RR: 18 (02-28-24 @ 13:41) (18 - 18)  SpO2: 97% (02-28-24 @ 13:41) (97% - 97%)    Physical Examination: INCOMPLETE  General - non-toxic appearing male/female in no acute distress  Cardiovascular - peripheral pulses palpable, no edema  Respiratory - breathing comfortably with no increased work of breathing    Neurologic Exam:  Mental status - Awake, Alert, Oriented to person, place, and time. Speech fluent, repetition and naming intact. Follows simple and complex commands. Attention/concentration, recent and remote memory (including registration 3/3 and recall 3/3), and fund of knowledge intact    Cranial nerves - PERRLA (4mm -> 3mm b/l), VFF, EOMI, face sensation (V1-V3) intact b/l, facial strength intact without asymmetry b/l, hearing intact b/l, palate with symmetric elevation, trapezius OR sternocleidomastiod 5/5 strength b/l, tongue midline on protrusion with full lateral movement    Motor - Normal bulk and tone throughout. No pronator drift.  Strength testing            Deltoid      Biceps      Triceps     Wrist Extension    Wrist Flexion     Interossei         R            5                 5               5                     5                              5                        5                 5  L             5                 5               5                     5                              5                        5                 5              Hip Flexion    Hip Extension    Knee Flexion    Knee Extension    Dorsiflexion    Plantar Flexion  R              5                         5                       5                           5                            5                          5  L              5                         5                        5                           5                            5                          5    Sensation - Light touch/temperature OR pain/vibration intact throughout  DTR's -             Biceps      Triceps     Brachioradialis      Patellar    Ankle    Toes/plantar response  R             2+             2+                  2+                       2+            2+                 Down  L              2+             2+                 2+                        2+           2+                 Down    Coordination - Finger to Nose intact b/l. No tremors appreciated    Gait and station - Normal casual gait. Romberg (-)    Labs:          CAPILLARY BLOOD GLUCOSE  108 (28 Feb 2024 14:00)      POCT Blood Glucose.: 108 mg/dL (28 Feb 2024 13:43)          CSF:                  Radiology:     Neurology - Consult Note    Spectra: [phone] ([facility]), [phone] ([facility])    HPI: 68-year-old male with history of renal cell carcinoma status post nephrectomy, hyperlipidemia presenting with concern for dizziness.    Patient states last night he started not feeling well and then approximately 3 AM started having low-grade fevers, though at this time did not have any sensations of dizziness.    On day of presentation, when getting out of bed felt the onset of 'room spinning' + nausea. Symptoms worsened with sitting up/standing up, also worsened with turning head to the L.  Has had 1 prior episode of vertigo in the past (similar but less severe in nature), was attributed to medication (BP medication) adjustments.  Reports no recent illnesses infections.  /91  glucose 108   2/28/24  NIHSS 0  MRS 0    Last medication change 1 month ago (amlodipine increased 5->10)  FH vertigo sister  Intact with ADLs  No vision changes.  Chronic bilat tinnitus    Review of Systems:   NEUROLOGICAL: +As stated in HPI above  All other review of systems is negative unless indicated above.    Allergies:  No Known Allergies      PMHx/PSHx/Family Hx: As above, otherwise see below       Social Hx:  as above    Medications:  MEDICATIONS  (STANDING):    MEDICATIONS  (PRN):      Vitals:  T(C): 36.8 (02-28-24 @ 13:41), Max: 36.8 (02-28-24 @ 13:41)  HR: 97 (02-28-24 @ 13:41) (97 - 97)  BP: 144/91 (02-28-24 @ 13:41) (144/91 - 144/91)  RR: 18 (02-28-24 @ 13:41) (18 - 18)  SpO2: 97% (02-28-24 @ 13:41) (97% - 97%)    Physical Examination:  General - non-toxic appearing male in no acute distress  Neurologic Exam:  Mental status - Awake, Alert, Oriented to person, place, and time. Speech fluent, repetition and naming intact. Follows simple and complex commands.   Cranial nerves - VFF, EOMI, face sensation (V1-V3) intact b/l, facial strength intact without asymmetry b/l, hearing intact b/l, palate with symmetric elevation, sternocleidomastiod 5/5 strength b/l, tongue midline on protrusion with full lateral movement. Few beats of R nystagmus. No vertical skew. Head impulse test no rapid correction appreciated  Motor - Normal bulk and tone throughout. No pronator drift.  Strength testing 5/5 b/l UE, b/l LE no drift  Sensation - Light touch intact throughout  DTR's - deferred  Coordination - Finger to Nose intact b/l. No tremors appreciated. Heel to shin intact b/l.  Gait and station - Stands up, stumbles after taking 1/2 a step.    Labs:    CAPILLARY BLOOD GLUCOSE  108 (28 Feb 2024 14:00)  POCT Blood Glucose.: 108 mg/dL (28 Feb 2024 13:43)    Radiology:  CT angiogram head:  -No vaso-occlusive disease.    CT angiogram neck:  -No vaso-occlusive disease.    -No acute transcortical infarct or intracranial hemorrhage identified.   MRI can be considered if clinical concern for acute infarct persists.   Neurology - Consult Note    Spectra: [phone] ([facility]), [phone] ([facility])    HPI: 68-year-old male with history of renal cell carcinoma status post nephrectomy, hyperlipidemia presenting with concern for dizziness.    Patient states last night he started not feeling well and then approximately 3 AM started having low-grade fevers, though at this time did not have any sensations of dizziness.    On day of presentation, when getting out of bed felt the onset of 'room spinning' + nausea. Symptoms worsened with sitting up/standing up, also worsened with turning head to the L.  Has had 1 prior episode of vertigo in the past (similar but less severe in nature), was attributed to medication (BP medication) adjustments.  Reports no recent illnesses infections.  /91  glucose 108   2/28/24  NIHSS 0  MRS 0    Last medication change 1 month ago (amlodipine increased 5->10)  FH vertigo sister  Intact with ADLs  No vision changes. Denies acute hearing changes, advocates chronic tinnitus b/l. Denies weakness/speech changes/sensory changes.    Review of Systems:   NEUROLOGICAL: +As stated in HPI above  All other review of systems is negative unless indicated above.    Allergies:  No Known Allergies      PMHx/PSHx/Family Hx: As above, otherwise see below       Social Hx:  as above    Medications:  MEDICATIONS  (STANDING):    MEDICATIONS  (PRN):      Vitals:  T(C): 36.8 (02-28-24 @ 13:41), Max: 36.8 (02-28-24 @ 13:41)  HR: 97 (02-28-24 @ 13:41) (97 - 97)  BP: 144/91 (02-28-24 @ 13:41) (144/91 - 144/91)  RR: 18 (02-28-24 @ 13:41) (18 - 18)  SpO2: 97% (02-28-24 @ 13:41) (97% - 97%)    Physical Examination:  General - non-toxic appearing male in no acute distress  Neurologic Exam:  Mental status - Awake, Alert, Oriented to person, place, and time. Speech fluent, repetition and naming intact. Follows simple and complex commands.   Cranial nerves - VFF, EOMI, face sensation (V1-V3) intact b/l, facial strength intact without asymmetry b/l, hearing intact b/l, palate with symmetric elevation, sternocleidomastiod 5/5 strength b/l, tongue midline on protrusion with full lateral movement. Few beats of R nystagmus. No vertical skew. Head impulse test no rapid correction appreciated  Motor - Normal bulk and tone throughout. No pronator drift.  Strength testing 5/5 b/l UE, b/l LE no drift  Sensation - Light touch intact throughout  DTR's - deferred  Coordination - Finger to Nose intact b/l. No tremors appreciated. Heel to shin intact b/l.  Gait and station - Stands up, stumbles after taking 1/2 a step.    Labs:    CAPILLARY BLOOD GLUCOSE  108 (28 Feb 2024 14:00)  POCT Blood Glucose.: 108 mg/dL (28 Feb 2024 13:43)    Radiology:  CT angiogram head:  -No vaso-occlusive disease.    CT angiogram neck:  -No vaso-occlusive disease.    -No acute transcortical infarct or intracranial hemorrhage identified.   MRI can be considered if clinical concern for acute infarct persists.

## 2024-02-29 VITALS
DIASTOLIC BLOOD PRESSURE: 86 MMHG | RESPIRATION RATE: 18 BRPM | HEART RATE: 86 BPM | SYSTOLIC BLOOD PRESSURE: 124 MMHG | OXYGEN SATURATION: 95 % | TEMPERATURE: 99 F

## 2024-02-29 LAB
A1C WITH ESTIMATED AVERAGE GLUCOSE RESULT: 6 % — HIGH (ref 4–5.6)
ESTIMATED AVERAGE GLUCOSE: 126 MG/DL — HIGH (ref 68–114)

## 2024-02-29 PROCEDURE — 85610 PROTHROMBIN TIME: CPT

## 2024-02-29 PROCEDURE — 97161 PT EVAL LOW COMPLEX 20 MIN: CPT

## 2024-02-29 PROCEDURE — 80053 COMPREHEN METABOLIC PANEL: CPT

## 2024-02-29 PROCEDURE — 70450 CT HEAD/BRAIN W/O DYE: CPT | Mod: MC

## 2024-02-29 PROCEDURE — 80048 BASIC METABOLIC PNL TOTAL CA: CPT

## 2024-02-29 PROCEDURE — 99285 EMERGENCY DEPT VISIT HI MDM: CPT | Mod: 25

## 2024-02-29 PROCEDURE — G0378: CPT

## 2024-02-29 PROCEDURE — 80061 LIPID PANEL: CPT

## 2024-02-29 PROCEDURE — 70496 CT ANGIOGRAPHY HEAD: CPT | Mod: MC

## 2024-02-29 PROCEDURE — 36415 COLL VENOUS BLD VENIPUNCTURE: CPT

## 2024-02-29 PROCEDURE — 85025 COMPLETE CBC W/AUTO DIFF WBC: CPT

## 2024-02-29 PROCEDURE — 70551 MRI BRAIN STEM W/O DYE: CPT | Mod: MC

## 2024-02-29 PROCEDURE — 82962 GLUCOSE BLOOD TEST: CPT

## 2024-02-29 PROCEDURE — 84484 ASSAY OF TROPONIN QUANT: CPT

## 2024-02-29 PROCEDURE — 99238 HOSP IP/OBS DSCHRG MGMT 30/<: CPT

## 2024-02-29 PROCEDURE — 93005 ELECTROCARDIOGRAM TRACING: CPT

## 2024-02-29 PROCEDURE — 87637 SARSCOV2&INF A&B&RSV AMP PRB: CPT

## 2024-02-29 PROCEDURE — 83036 HEMOGLOBIN GLYCOSYLATED A1C: CPT

## 2024-02-29 PROCEDURE — 70498 CT ANGIOGRAPHY NECK: CPT | Mod: MC

## 2024-02-29 RX ORDER — ACETAMINOPHEN 500 MG
650 TABLET ORAL ONCE
Refills: 0 | Status: COMPLETED | OUTPATIENT
Start: 2024-02-29 | End: 2024-02-29

## 2024-02-29 RX ORDER — MECLIZINE HCL 12.5 MG
1 TABLET ORAL
Qty: 15 | Refills: 0
Start: 2024-02-29 | End: 2024-03-04

## 2024-02-29 RX ORDER — SODIUM CHLORIDE 9 MG/ML
1000 INJECTION INTRAMUSCULAR; INTRAVENOUS; SUBCUTANEOUS ONCE
Refills: 0 | Status: DISCONTINUED | OUTPATIENT
Start: 2024-02-29 | End: 2024-02-29

## 2024-02-29 RX ORDER — SODIUM CHLORIDE 9 MG/ML
500 INJECTION INTRAMUSCULAR; INTRAVENOUS; SUBCUTANEOUS ONCE
Refills: 0 | Status: COMPLETED | OUTPATIENT
Start: 2024-02-29 | End: 2024-02-29

## 2024-02-29 RX ADMIN — Medication 0.5 MILLIGRAM(S): at 08:57

## 2024-02-29 RX ADMIN — Medication 650 MILLIGRAM(S): at 06:43

## 2024-02-29 RX ADMIN — SODIUM CHLORIDE 100 MILLILITER(S): 9 INJECTION INTRAMUSCULAR; INTRAVENOUS; SUBCUTANEOUS at 01:03

## 2024-02-29 NOTE — ED CDU PROVIDER SUBSEQUENT DAY NOTE - ATTENDING APP SHARED VISIT CONTRIBUTION OF CARE
I performed a history and physical exam of the patient and discussed their management with the resident and /or advanced care provider. I reviewed the resident and /or ACP's note and agree with the documented findings and plan of care. My medical decision making and observations are found above.  Lungs clear, awake alert, symptoms worsening with head motion.

## 2024-02-29 NOTE — PHYSICAL THERAPY INITIAL EVALUATION ADULT - ADDITIONAL COMMENTS
Patient lives in pvt house with spouse,3  steps to enter. 13 steps inside.  Hand rails +. Family available to asisst at all times. Patient ambulated without AD independent.

## 2024-02-29 NOTE — ED CDU PROVIDER DISPOSITION NOTE - PATIENT PORTAL LINK FT
You can access the FollowMyHealth Patient Portal offered by [facility] by registering at the following website: [email or web address]. By joining [employer]’s FollowMyHealth portal, you will also be able to view your health information using other applications (apps) compatible with our system.

## 2024-02-29 NOTE — ED CDU PROVIDER DISPOSITION NOTE - ATTENDING APP SHARED VISIT CONTRIBUTION OF CARE
I Scotty Gomez MD have personally performed a face to face diagnostic evaluation on this patient.  I have reviewed the ACP note and agree with the history, exam, and plan of care, except as noted.   My medical decison making and observations are found above.  The patient is stable for discharge from CDU.  Lungs clear, abd soft, positional vertigo on exam

## 2024-02-29 NOTE — ED CDU PROVIDER SUBSEQUENT DAY NOTE - HISTORY
No interval changes since initial CDU provider note. Pt without new complaint. NAD VSS. Per orthostatic vitals, when patient laying HR 94 to standing , given IV hydration as patient with persistent dizziness and will reevaluate. no events on tele. - KALEY Prater

## 2024-02-29 NOTE — CHART NOTE - NSCHARTNOTEFT_GEN_A_CORE
EMERGENCY : SW consulted by CDU PA as patient cleared for discharge, seen by physical therapy and recommended for a rolling walker and outpatient PT. CDU provider to provide patient with script for outpatient PT. SW consulted to provide RW to bedside for discharge. LCSW reviewed patient's chart. As per chart review patient is a "68-year-old male with PMH of renal cell carcinoma status post right nephrectomy presents to the ED complaining of acute onset dizziness which began approximately 3 AM this morning when he awoke from sleep with episode of fever and chills."     LCSW met with patient at bedside and introduced self and role to which he verbalized understanding. Patient is A&Ox4 at this time. Caregiver declined, emergency contact identified as his wife Donna Blanco PH: [phone]. Patient resides in a  in [place], [place] with his family, with 3 steps to enter. Patient states he does not have a RW at home. LCSW provided patient with RW at bedside. DME referral to [facility] sent via careport. Patient states no further needs, no safety concerns for discharge. Ongoing social work availability ensured. SW continues to remain available as needed. Above information provided to CDU PA.

## 2024-02-29 NOTE — ED CDU PROVIDER SUBSEQUENT DAY NOTE - PROGRESS NOTE DETAILS
Received pt at signout at 0900 from PA . Case/plan reviewed. patient cleared by neurology, PT evaluated patient and recommended outpatient vestibular therapy and rolling walker which was given to the patient by case management. he is feeling improved following administration of benzos and walking around the unit without assistance using the walker. will discharge now. c/d/w Dr. Gomez Received pt at signout at 0900 from PA . Case/plan reviewed. patient cleared by neurology, PT evaluated patient and recommended outpatient vestibular therapy and rolling walker which was given to the patient by case management. he is feeling improved following administration of benzos and walking around the unit without assistance using the walker. will discharge now. discussed hgb a1c. c/d/w Dr. Gomez

## 2024-02-29 NOTE — PHYSICAL THERAPY INITIAL EVALUATION ADULT - BALANCE DISTURBANCE, SYSTEM IMPAIRMENT CONTRIBUTE, REHAB EVAL
Patient is presently admitted to inpatient clinic at home behavioral health unit.  I did reach out to Dr. Mancilla for notification of abnormal vitamin D level and need for treatment. c/o vertigo when looking down./vestibular

## 2024-02-29 NOTE — ED CDU PROVIDER DISPOSITION NOTE - CLINICAL COURSE
68-year-old male with history of renal cell carcinoma status post nephrectomy presenting with concern for dizziness.  pt states he awoke at 3am today went to get up and became very dizzy and unsteady and had to sit back down. pt states that he had something similar occur years ago but was medication related. no new medications, only had increase of his amlodipine and that was 1 month ago. +N, no vomiting.  Symptoms improve when not moving. symptoms improved while in ED but still dizzy with movement.  No coughs or colds. No black or bloody stools.  Patient denies any chest pain.  Does have a history of high blood pressure and has increased his meds recently. no URI symptoms. +chills yesterday, no recorded fever.  meds amlodipine 10mg, atorvastatin 40mg and fenofibrate 145mg  ED course: CODE STROKE. CT/CTA showed "No acute transcortical infarct or intracranial hemorrhage identified. No vaso-occlusive disease." Plan for MRI and symptom management in CDU. 68-year-old male with history of renal cell carcinoma status post nephrectomy presenting with concern for dizziness.  pt states he awoke at 3am today went to get up and became very dizzy and unsteady and had to sit back down. pt states that he had something similar occur years ago but was medication related. no new medications, only had increase of his amlodipine and that was 1 month ago. +N, no vomiting.  Symptoms improve when not moving. symptoms improved while in ED but still dizzy with movement.  No coughs or colds. No black or bloody stools.  Patient denies any chest pain.  Does have a history of high blood pressure and has increased his meds recently. no URI symptoms. +chills yesterday, no recorded fever.  meds amlodipine 10mg, atorvastatin 40mg and fenofibrate 145mg  ED course: CODE STROKE. CT/CTA showed "No acute transcortical infarct or intracranial hemorrhage identified. No vaso-occlusive disease." Plan for MRI and symptom management in CDU.  MRI negative for acute stroke, patient feeling improved. seen and evaluated by PT and able to walk following benzo administration using rolling walker. patient do be discharged with outpatient follow up. case discussed with Dr. Gomez

## 2024-02-29 NOTE — ED ADULT NURSE REASSESSMENT NOTE - NS ED NURSE REASSESS COMMENT FT1
Pt received from AUREA Benson. Pt oriented to CDU & plan of care was discussed. Pt A&O x 4. Pt in CDU for MRI results, tele, monitor dizziness. Pt denies any chest pain, SOB, dizziness or palpitations. On neuro exam, A&O x 4, PERRLA, EOMI,  strength equal, sensation intact, clear speech. V/S stable, pt afebrile,  IV in place, patent and free of signs of infiltration. Pt resting in bed. Safety & comfort measures maintained. Call bell in reach. Will continue to monitor.    @0900 pt received Ativan 0.5mg d/t dizziness.     @1030 Pt ambulated with walker. no dizziness at this time. pt states he only feels dizzy when looking down.

## 2024-02-29 NOTE — PHYSICAL THERAPY INITIAL EVALUATION ADULT - PERTINENT HX OF CURRENT PROBLEM, REHAB EVAL
68-year-old male with PMH of renal cell carcinoma status post right nephrectomy presents to the ED complaining of acute onset dizziness which began approximately 3 AM this morning when he awoke from sleep with episode of fever and chills.  Patient reports he has constant tinnitus in his ears which is chronic, developed room spinning dizziness since 3 AM, today had difficulty walking due to vertigo and came to the emergency department for evaluation.  Patient denies prior history of vertigo.  Patient reports no change in hearing, no other recent URI symptoms. Acute vestibular syndrome which has higher suspicion of peripheral etiology, lower suspicion for central etiology given fluctuating symptoms with complete resolution back to baseline when not , prior similar episode, FH vertigo. CT Brain- No acute transcortical infarct or intracranial hemorrhage identified.

## 2024-02-29 NOTE — ED CDU PROVIDER DISPOSITION NOTE - NSFOLLOWUPINSTRUCTIONS_ED_ALL_ED_FT
Hydrate.     You can take Meclizine 12.5mg up to twice a day as needed for dizziness.     You may be contacted by our Emergency Department Referrals Coordinator to set up your follow up appointment within 24-48 hours of your discharge Monday- Friday: ENT and Vestibular Rehab.     Follow up with NEUROLOGY Dr. Esaley. Call [phone] to make a follow up appointment.     We recommend you follow up with your primary care provider within the next 2-3 days, please bring all of your results with you.    Please return to the Emergency Department with new, worsening, or concerning symptoms, such as:  -Shortness of breath or trouble breathing  -Pressure, pain, tightness in chest  -Facial drooping, arm weakness, or speech difficulty   -Head injury or loss of consciousness   -Nonstop bleeding or an open wound     *More detailed information regarding your visit and discharge can be found by reviewing this packet Hydrate.     You can take Meclizine 25 mg up to 3x per day  as needed for dizziness.     If this does not help, you can take ativan .5 up to 3x per day. be aware this will make you sleepy. do not drink, drive or operate heavy machinery while taking.    You may be contacted by our Emergency Department Referrals Coordinator to set up your follow up appointment within 24-48 hours of your discharge Monday- Friday: ENT and Vestibular Rehab.     Follow up with NEUROLOGY Dr. Easley. Call [phone] to make a follow up appointment.     We recommend you follow up with your primary care provider within the next 2-3 days, please bring all of your results with you.    Please return to the Emergency Department with new, worsening, or concerning symptoms, such as:  -Shortness of breath or trouble breathing  -Pressure, pain, tightness in chest  -Facial drooping, arm weakness, or speech difficulty   - inability to walk      *More detailed information regarding your visit and discharge can be found by reviewing this packet

## 2024-02-29 NOTE — ED ADULT NURSE REASSESSMENT NOTE - NS ED NURSE REASSESS COMMENT FT1
Pt received from AUREA Rosales at 19:00hrs. Pt A&O x 4. Pt in CDU for Telemetry, Neuro check, and MRI results . Pt denies any chest pain, SOB, or palpitations at this time. c/o dizziness when OOB. NS 100ml/hour X 5 hours started as ordered. V/S stable,  IV20G, BL, patent and free of signs of infiltration. Safety & comfort measures maintained. Educated patient to call for assistance as needed. Call bell in reach. Will continue to monitor.

## 2024-02-29 NOTE — ED CDU PROVIDER SUBSEQUENT DAY NOTE - CLINICAL SUMMARY MEDICAL DECISION MAKING FREE TEXT BOX
Patricia: Patient is 68-year-old with very positional vertigo.  Patient with CT and MRI negative.  Patient will be seen by neurology.  Patient still with vertigo this morning.  Will add benzodiazepines to the anticholinergics that have already been tried.  Would also try Epley maneuver.  Will try to get to a point where patient can walk and perform ADLs with what really seems like peripheral vertigo.

## 2024-02-29 NOTE — PHYSICAL THERAPY INITIAL EVALUATION ADULT - NSPTDMEREC_GEN_A_CORE
Patient will require RW at home to help complete mobility related activities for daily living./rolling walker

## 2024-03-01 ENCOUNTER — TRANSCRIPTION ENCOUNTER (OUTPATIENT)
Age: 69
End: 2024-03-01

## 2024-08-04 ENCOUNTER — NON-APPOINTMENT (OUTPATIENT)
Age: 69
End: 2024-08-04

## 2024-10-22 ENCOUNTER — NON-APPOINTMENT (OUTPATIENT)
Age: 69
End: 2024-10-22

## 2024-10-23 ENCOUNTER — APPOINTMENT (OUTPATIENT)
Dept: UROLOGY | Facility: CLINIC | Age: 69
End: 2024-10-23
Payer: MEDICARE

## 2024-10-23 DIAGNOSIS — C64.9 MALIGNANT NEOPLASM OF UNSPECIFIED KIDNEY, EXCEPT RENAL PELVIS: ICD-10-CM

## 2024-10-23 PROCEDURE — 99214 OFFICE O/P EST MOD 30 MIN: CPT

## 2024-10-23 PROCEDURE — G2211 COMPLEX E/M VISIT ADD ON: CPT

## 2024-10-24 LAB
APPEARANCE: CLEAR
BACTERIA: NEGATIVE /HPF
BILIRUBIN URINE: NEGATIVE
BLOOD URINE: NEGATIVE
CAST: 0 /LPF
COLOR: YELLOW
EPITHELIAL CELLS: 0 /HPF
GLUCOSE QUALITATIVE U: NEGATIVE MG/DL
KETONES URINE: NEGATIVE MG/DL
LEUKOCYTE ESTERASE URINE: NEGATIVE
MICROSCOPIC-UA: NORMAL
NITRITE URINE: NEGATIVE
PH URINE: 5.5
PROTEIN URINE: 30 MG/DL
PSA FREE FLD-MCNC: 57 %
PSA FREE SERPL-MCNC: 0.69 NG/ML
PSA SERPL-MCNC: 1.2 NG/ML
RED BLOOD CELLS URINE: 2 /HPF
REVIEW: NORMAL
SPECIFIC GRAVITY URINE: 1.02
UROBILINOGEN URINE: 0.2 MG/DL
WHITE BLOOD CELLS URINE: 3 /HPF

## 2025-02-26 ENCOUNTER — OFFICE (OUTPATIENT)
Facility: LOCATION | Age: 70
Setting detail: OPHTHALMOLOGY
End: 2025-02-26
Payer: MEDICARE

## 2025-02-26 DIAGNOSIS — H25.13: ICD-10-CM

## 2025-02-26 DIAGNOSIS — H40.013: ICD-10-CM

## 2025-02-26 DIAGNOSIS — H25.12: ICD-10-CM

## 2025-02-26 PROBLEM — H25.11 CATARACT SENILE NUCLEAR SCLEROSIS; RIGHT EYE, LEFT EYE, BOTH EYES: Status: ACTIVE | Noted: 2025-02-26

## 2025-02-26 PROCEDURE — 92136 OPHTHALMIC BIOMETRY: CPT | Mod: TC | Performed by: OPHTHALMOLOGY

## 2025-02-26 PROCEDURE — 92014 COMPRE OPH EXAM EST PT 1/>: CPT | Performed by: OPHTHALMOLOGY

## 2025-02-26 PROCEDURE — 92136 OPHTHALMIC BIOMETRY: CPT | Mod: 26,LT | Performed by: OPHTHALMOLOGY

## 2025-02-26 ASSESSMENT — KERATOMETRY
OD_AXISANGLE_DEGREES: 180
OD_AXISANGLE2_DEGREES: 090
OS_K1POWER_DIOPTERS: 41.00
OS_CYLAXISANGLE_DEGREES: 135
OS_AXISANGLE2_DEGREES: 135
OS_K1K2_AVERAGE: 41.125
OS_K2POWER_DIOPTERS: 41.25
OD_CYLAXISANGLE_DEGREES: 180
OD_K1K2_AVERAGE: 41.25
OD_K1POWER_DIOPTERS: 41.25
OS_AXISANGLE_DEGREES: 45
OD_K2POWER_DIOPTERS: 41.25
OS_CYLPOWER_DEGREES: 0.25

## 2025-02-26 ASSESSMENT — LID EXAM ASSESSMENTS: OD_EDEMA: ABSENT

## 2025-02-26 ASSESSMENT — CONFRONTATIONAL VISUAL FIELD TEST (CVF)
OD_FINDINGS: FULL
OS_FINDINGS: FULL

## 2025-02-27 ASSESSMENT — REFRACTION_MANIFEST
OS_VA1: 20/20
OS_VA1: 20/20
OD_CYLINDER: -0.25
OU_VA: 20/20
OS_ADD: +2.50
OD_SPHERE: +1.50
OS_CYLINDER: SPH
OD_VA1: 20/20
OD_VA1: 20/15
OD_AXIS: 090
OD_ADD: +2.50
OS_SPHERE: +1.25
OD_SPHERE: +1.50
OS_SPHERE: +1.25
OD_CYLINDER: SPH
OS_CYLINDER: SPH

## 2025-02-27 ASSESSMENT — REFRACTION_CURRENTRX
OD_VPRISM_DIRECTION: PROGS
OD_AXIS: 141
OS_AXIS: 042
OS_SPHERE: +3.25
OS_CYLINDER: -0.75
OD_CYLINDER: -0.25
OS_VPRISM_DIRECTION: PROGS
OD_SPHERE: +2.75
OD_OVR_VA: 20/
OS_OVR_VA: 20/

## 2025-02-27 ASSESSMENT — REFRACTION_AUTOREFRACTION
OD_CYLINDER: -1.00
OD_AXIS: 102
OS_AXIS: 091
OS_SPHERE: +2.25
OS_CYLINDER: -0.75
OD_SPHERE: +2.25

## 2025-02-27 ASSESSMENT — KERATOMETRY
OS_K1POWER_DIOPTERS: 41.00
OD_AXISANGLE_DEGREES: 090
OD_K1POWER_DIOPTERS: 41.25
OS_AXISANGLE_DEGREES: 135
OD_K2POWER_DIOPTERS: 41.25
OS_K2POWER_DIOPTERS: 41.25

## 2025-02-27 ASSESSMENT — VISUAL ACUITY
OS_BCVA: 20/50-*
OD_BCVA: 20/50-*

## 2025-03-19 ENCOUNTER — NON-APPOINTMENT (OUTPATIENT)
Age: 70
End: 2025-03-19

## 2025-03-31 ENCOUNTER — OFFICE (OUTPATIENT)
Dept: URBAN - METROPOLITAN AREA CLINIC 94 | Facility: CLINIC | Age: 70
Setting detail: OPHTHALMOLOGY
End: 2025-03-31
Payer: MEDICARE

## 2025-03-31 DIAGNOSIS — H40.013: ICD-10-CM

## 2025-03-31 DIAGNOSIS — H25.13: ICD-10-CM

## 2025-03-31 DIAGNOSIS — Z01.818: ICD-10-CM

## 2025-03-31 PROCEDURE — 99212 OFFICE O/P EST SF 10 MIN: CPT

## 2025-04-01 ENCOUNTER — ASC (OUTPATIENT)
Dept: URBAN - METROPOLITAN AREA SURGERY 8 | Facility: SURGERY | Age: 70
Setting detail: OPHTHALMOLOGY
End: 2025-04-01
Payer: MEDICARE

## 2025-04-01 DIAGNOSIS — H25.12: ICD-10-CM

## 2025-04-01 DIAGNOSIS — H52.222: ICD-10-CM

## 2025-04-01 PROCEDURE — S9986 NOT MEDICALLY NECESSARY SVC: HCPCS | Mod: GX,GY | Performed by: OPHTHALMOLOGY

## 2025-04-01 PROCEDURE — 66984 XCAPSL CTRC RMVL W/O ECP: CPT | Mod: LT | Performed by: OPHTHALMOLOGY

## 2025-04-01 PROCEDURE — 68841 INSJ RX ELUT IMPLT LAC CANAL: CPT | Mod: LT | Performed by: OPHTHALMOLOGY

## 2025-04-01 PROCEDURE — FEMTO PRECISION LASER CATARACT SURGERY: Mod: GY | Performed by: OPHTHALMOLOGY

## 2025-04-02 ENCOUNTER — OFFICE (OUTPATIENT)
Facility: LOCATION | Age: 70
Setting detail: OPHTHALMOLOGY
End: 2025-04-02
Payer: MEDICARE

## 2025-04-02 ENCOUNTER — RX ONLY (RX ONLY)
Age: 70
End: 2025-04-02

## 2025-04-02 DIAGNOSIS — H25.11: ICD-10-CM

## 2025-04-02 DIAGNOSIS — Z96.1: ICD-10-CM

## 2025-04-02 PROBLEM — Z01.818 ENCOUNTER FOR OTHER PREPROCEDURAL EXAMINATION: Status: ACTIVE | Noted: 2025-03-31

## 2025-04-02 PROCEDURE — 99024 POSTOP FOLLOW-UP VISIT: CPT | Performed by: OPHTHALMOLOGY

## 2025-04-02 PROCEDURE — 92136 OPHTHALMIC BIOMETRY: CPT | Mod: 26,RT | Performed by: OPHTHALMOLOGY

## 2025-04-02 ASSESSMENT — REFRACTION_AUTOREFRACTION
OD_CYLINDER: -0.75
OS_AXIS: 045
OS_CYLINDER: -0.25
OD_AXIS: 091
OS_SPHERE: +0.25
OD_SPHERE: +2.00

## 2025-04-02 ASSESSMENT — KERATOMETRY
OS_K2POWER_DIOPTERS: 41.50
OD_AXISANGLE_DEGREES: 128
OD_K1POWER_DIOPTERS: 41.00
OS_AXISANGLE_DEGREES: 105
OS_K1POWER_DIOPTERS: 41.00
OD_K2POWER_DIOPTERS: 41.25

## 2025-04-02 ASSESSMENT — CONFRONTATIONAL VISUAL FIELD TEST (CVF)
OD_FINDINGS: FULL
OS_FINDINGS: FULL

## 2025-04-02 ASSESSMENT — VISUAL ACUITY
OD_BCVA: 20/25-2
OS_BCVA: 20/20-2

## 2025-04-02 ASSESSMENT — LID EXAM ASSESSMENTS: OD_EDEMA: ABSENT

## 2025-04-10 ENCOUNTER — NON-APPOINTMENT (OUTPATIENT)
Age: 70
End: 2025-04-10

## 2025-04-29 ENCOUNTER — ASC (OUTPATIENT)
Dept: URBAN - METROPOLITAN AREA SURGERY 8 | Facility: SURGERY | Age: 70
Setting detail: OPHTHALMOLOGY
End: 2025-04-29
Payer: MEDICARE

## 2025-04-29 DIAGNOSIS — H52.221: ICD-10-CM

## 2025-04-29 DIAGNOSIS — H25.11: ICD-10-CM

## 2025-04-29 PROCEDURE — 68841 INSJ RX ELUT IMPLT LAC CANAL: CPT | Mod: 79,RT | Performed by: OPHTHALMOLOGY

## 2025-04-29 PROCEDURE — FEMTO PRECISION LASER CATARACT SURGERY: Mod: GY | Performed by: OPHTHALMOLOGY

## 2025-04-29 PROCEDURE — 66984 XCAPSL CTRC RMVL W/O ECP: CPT | Mod: 79,RT | Performed by: OPHTHALMOLOGY

## 2025-04-29 PROCEDURE — S9986 NOT MEDICALLY NECESSARY SVC: HCPCS | Mod: GX,GY | Performed by: OPHTHALMOLOGY

## 2025-04-30 ENCOUNTER — OFFICE (OUTPATIENT)
Facility: LOCATION | Age: 70
Setting detail: OPHTHALMOLOGY
End: 2025-04-30
Payer: MEDICARE

## 2025-04-30 DIAGNOSIS — Z96.1: ICD-10-CM

## 2025-04-30 PROCEDURE — 99024 POSTOP FOLLOW-UP VISIT: CPT | Performed by: OPHTHALMOLOGY

## 2025-04-30 ASSESSMENT — VISUAL ACUITY
OS_BCVA: 20/25-2
OD_BCVA: 20/20+

## 2025-04-30 ASSESSMENT — CONFRONTATIONAL VISUAL FIELD TEST (CVF)
OD_FINDINGS: FULL
OS_FINDINGS: FULL

## 2025-06-04 ENCOUNTER — OFFICE (OUTPATIENT)
Facility: LOCATION | Age: 70
Setting detail: OPHTHALMOLOGY
End: 2025-06-04
Payer: MEDICARE

## 2025-06-04 DIAGNOSIS — Z96.1: ICD-10-CM

## 2025-06-04 PROBLEM — H02.89 MEIBOMIAN GLAND DYSFUNCTION: Status: ACTIVE | Noted: 2025-06-04

## 2025-06-04 PROCEDURE — 99024 POSTOP FOLLOW-UP VISIT: CPT | Performed by: OPHTHALMOLOGY

## 2025-06-04 ASSESSMENT — VISUAL ACUITY
OD_BCVA: 20/20
OS_BCVA: 20/20

## 2025-06-04 ASSESSMENT — REFRACTION_AUTOREFRACTION
OS_SPHERE: 0.00
OS_AXIS: 032
OS_CYLINDER: -0.25
OD_SPHERE: 0.00
OD_CYLINDER: -0.75
OD_AXIS: 061

## 2025-06-04 ASSESSMENT — LID EXAM ASSESSMENTS: OD_EDEMA: ABSENT

## 2025-06-04 ASSESSMENT — KERATOMETRY
OD_K2POWER_DIOPTERS: 42.00
OS_K2POWER_DIOPTERS: 41.50
OD_AXISANGLE_DEGREES: 130
OD_K1POWER_DIOPTERS: 41.00
METHOD_AUTO_MANUAL: AUTO
OS_AXISANGLE_DEGREES: 099
OS_K1POWER_DIOPTERS: 41.00

## 2025-06-04 ASSESSMENT — CONFRONTATIONAL VISUAL FIELD TEST (CVF)
OD_FINDINGS: FULL
OS_FINDINGS: FULL

## 2025-07-19 ENCOUNTER — OFFICE (OUTPATIENT)
Facility: LOCATION | Age: 70
Setting detail: OPHTHALMOLOGY
End: 2025-07-19
Payer: MEDICARE

## 2025-07-19 DIAGNOSIS — H33.42: ICD-10-CM

## 2025-07-19 PROCEDURE — 99213 OFFICE O/P EST LOW 20 MIN: CPT | Mod: 24 | Performed by: OPHTHALMOLOGY

## 2025-07-19 PROCEDURE — 92134 CPTRZ OPH DX IMG PST SGM RTA: CPT | Performed by: OPHTHALMOLOGY

## 2025-07-19 ASSESSMENT — REFRACTION_AUTOREFRACTION
OS_SPHERE: 0.00
OD_SPHERE: 0.00
OS_AXIS: 032
OD_AXIS: 061
OS_CYLINDER: -0.25
OD_CYLINDER: -0.75

## 2025-07-19 ASSESSMENT — KERATOMETRY
OD_AXISANGLE_DEGREES: 130
OS_K2POWER_DIOPTERS: 41.50
OS_K1POWER_DIOPTERS: 41.00
METHOD_AUTO_MANUAL: AUTO
OS_AXISANGLE_DEGREES: 099
OD_K1POWER_DIOPTERS: 41.00
OD_K2POWER_DIOPTERS: 42.00

## 2025-07-19 ASSESSMENT — VISUAL ACUITY
OS_BCVA: 20/20-2
OD_BCVA: 20/25

## 2025-07-19 ASSESSMENT — CONFRONTATIONAL VISUAL FIELD TEST (CVF)
OS_FINDINGS: FULL
OD_FINDINGS: FULL

## 2025-07-19 ASSESSMENT — LID EXAM ASSESSMENTS: OD_EDEMA: ABSENT

## 2025-07-23 ENCOUNTER — OFFICE (OUTPATIENT)
Dept: URBAN - METROPOLITAN AREA CLINIC 77 | Facility: CLINIC | Age: 70
Setting detail: OPHTHALMOLOGY
End: 2025-07-23
Payer: MEDICARE

## 2025-07-23 VITALS
WEIGHT: 195 LBS | DIASTOLIC BLOOD PRESSURE: 86 MMHG | SYSTOLIC BLOOD PRESSURE: 149 MMHG | HEIGHT: 69 IN | BODY MASS INDEX: 28.88 KG/M2

## 2025-07-23 DIAGNOSIS — H43.822: ICD-10-CM

## 2025-07-23 PROCEDURE — 92250 FUNDUS PHOTOGRAPHY W/I&R: CPT | Performed by: OPHTHALMOLOGY

## 2025-07-23 PROCEDURE — 92235 FLUORESCEIN ANGRPH MLTIFRAME: CPT | Performed by: OPHTHALMOLOGY

## 2025-07-23 PROCEDURE — 99214 OFFICE O/P EST MOD 30 MIN: CPT | Mod: 24 | Performed by: OPHTHALMOLOGY

## 2025-07-23 ASSESSMENT — KERATOMETRY
OS_K1POWER_DIOPTERS: 41.00
OD_AXISANGLE_DEGREES: 130
METHOD_AUTO_MANUAL: AUTO
OS_AXISANGLE_DEGREES: 099
OD_K2POWER_DIOPTERS: 42.00
OD_K1POWER_DIOPTERS: 41.00
OS_K2POWER_DIOPTERS: 41.50

## 2025-07-23 ASSESSMENT — REFRACTION_AUTOREFRACTION
OS_CYLINDER: -0.25
OD_AXIS: 061
OD_SPHERE: 0.00
OS_AXIS: 032
OD_CYLINDER: -0.75
OS_SPHERE: 0.00

## 2025-07-23 ASSESSMENT — VISUAL ACUITY
OS_BCVA: 20/25
OD_BCVA: 20/25-

## 2025-07-23 ASSESSMENT — LID EXAM ASSESSMENTS: OD_EDEMA: ABSENT

## 2025-07-23 ASSESSMENT — CONFRONTATIONAL VISUAL FIELD TEST (CVF)
OD_FINDINGS: FULL
OS_FINDINGS: FULL

## 2025-09-15 ENCOUNTER — NON-APPOINTMENT (OUTPATIENT)
Age: 70
End: 2025-09-15

## 2025-09-17 ENCOUNTER — APPOINTMENT (OUTPATIENT)
Dept: UROLOGY | Facility: CLINIC | Age: 70
End: 2025-09-17
Payer: MEDICARE

## 2025-09-17 DIAGNOSIS — C64.9 MALIGNANT NEOPLASM OF UNSPECIFIED KIDNEY, EXCEPT RENAL PELVIS: ICD-10-CM

## 2025-09-17 PROCEDURE — G2211 COMPLEX E/M VISIT ADD ON: CPT

## 2025-09-17 PROCEDURE — 99214 OFFICE O/P EST MOD 30 MIN: CPT

## 2025-09-18 LAB
APPEARANCE: CLEAR
BACTERIA: NEGATIVE /HPF
BILIRUBIN URINE: NEGATIVE
BLOOD URINE: NEGATIVE
CAST: 0 /LPF
COLOR: YELLOW
EPITHELIAL CELLS: 0 /HPF
GLUCOSE QUALITATIVE U: NEGATIVE MG/DL
KETONES URINE: NEGATIVE MG/DL
LEUKOCYTE ESTERASE URINE: NEGATIVE
MICROSCOPIC-UA: NORMAL
NITRITE URINE: NEGATIVE
PH URINE: 5.5
PROTEIN URINE: NEGATIVE MG/DL
PSA FREE FLD-MCNC: 46 %
PSA FREE SERPL-MCNC: 0.38 NG/ML
PSA SERPL-MCNC: 0.83 NG/ML
RED BLOOD CELLS URINE: 0 /HPF
SPECIFIC GRAVITY URINE: 1.02
UROBILINOGEN URINE: 0.2 MG/DL
WHITE BLOOD CELLS URINE: 0 /HPF

## 2025-09-23 PROBLEM — M25.561 RIGHT KNEE PAIN: Status: ACTIVE | Noted: 2025-09-23

## 2025-09-23 PROBLEM — M17.11 PRIMARY OSTEOARTHRITIS OF RIGHT KNEE: Status: ACTIVE | Noted: 2025-09-23
